# Patient Record
Sex: FEMALE | Race: WHITE | URBAN - METROPOLITAN AREA
[De-identification: names, ages, dates, MRNs, and addresses within clinical notes are randomized per-mention and may not be internally consistent; named-entity substitution may affect disease eponyms.]

---

## 2017-04-25 ENCOUNTER — OFFICE VISIT (OUTPATIENT)
Dept: INTERNAL MEDICINE | Facility: CLINIC | Age: 72
End: 2017-04-25
Payer: MEDICARE

## 2017-04-25 VITALS
BODY MASS INDEX: 28.28 KG/M2 | TEMPERATURE: 98.3 F | HEIGHT: 63 IN | OXYGEN SATURATION: 97 % | SYSTOLIC BLOOD PRESSURE: 130 MMHG | HEART RATE: 81 BPM | WEIGHT: 159.6 LBS | DIASTOLIC BLOOD PRESSURE: 80 MMHG

## 2017-04-25 DIAGNOSIS — Z23 NEED FOR VACCINATION: ICD-10-CM

## 2017-04-25 DIAGNOSIS — M16.11 PRIMARY OSTEOARTHRITIS OF RIGHT HIP: ICD-10-CM

## 2017-04-25 DIAGNOSIS — E78.00 PURE HYPERCHOLESTEROLEMIA: ICD-10-CM

## 2017-04-25 DIAGNOSIS — I10 BENIGN ESSENTIAL HYPERTENSION: Primary | ICD-10-CM

## 2017-04-25 DIAGNOSIS — R73.01 ELEVATED FASTING GLUCOSE: ICD-10-CM

## 2017-04-25 DIAGNOSIS — S73.191S TEAR OF ACETABULAR LABRUM, RIGHT, SEQUELA: ICD-10-CM

## 2017-04-25 PROCEDURE — G0009 ADMIN PNEUMOCOCCAL VACCINE: HCPCS | Performed by: INTERNAL MEDICINE

## 2017-04-25 PROCEDURE — 90732 PPSV23 VACC 2 YRS+ SUBQ/IM: CPT | Performed by: INTERNAL MEDICINE

## 2017-04-25 PROCEDURE — 99202 OFFICE O/P NEW SF 15 MIN: CPT | Mod: 25 | Performed by: INTERNAL MEDICINE

## 2017-04-25 RX ORDER — CLOPIDOGREL BISULFATE 75 MG/1
75 TABLET ORAL DAILY
Refills: 3 | COMMUNITY
Start: 2017-02-06

## 2017-04-25 RX ORDER — LISINOPRIL 20 MG/1
20 TABLET ORAL DAILY
Refills: 3 | COMMUNITY
Start: 2017-02-16

## 2017-04-25 RX ORDER — LISINOPRIL 10 MG/1
10 TABLET ORAL EVERY MORNING
Refills: 3 | COMMUNITY
Start: 2017-02-16

## 2017-04-25 RX ORDER — PAROXETINE HYDROCHLORIDE HEMIHYDRATE 12.5 MG/1
12.5 TABLET, FILM COATED, EXTENDED RELEASE ORAL DAILY
Refills: 3 | COMMUNITY
Start: 2017-04-13

## 2017-04-25 RX ORDER — FENOFIBRATE 160 MG/1
160 TABLET ORAL DAILY
Refills: 3 | COMMUNITY
Start: 2017-02-07

## 2017-04-25 NOTE — NURSING NOTE
"Chief Complaint   Patient presents with     Establish Care     Would like to discuss about about Hypertension, Pre diabetes, dyslipidemia.     Referral     For ortho for R hip pain.       Initial /80 (BP Location: Left arm, Patient Position: Chair, Cuff Size: Adult Regular)  Pulse 81  Temp 98.3  F (36.8  C) (Oral)  Ht 5' 2.7\" (1.593 m)  Wt 159 lb 9.6 oz (72.4 kg)  SpO2 97%  BMI 28.54 kg/m2 Estimated body mass index is 28.54 kg/(m^2) as calculated from the following:    Height as of this encounter: 5' 2.7\" (1.593 m).    Weight as of this encounter: 159 lb 9.6 oz (72.4 kg).  Medication Reconciliation: complete       Screening Questionnaire for Adult Immunization    Are you sick today?   No   Do you have allergies to medications, food, a vaccine component or latex?   No   Have you ever had a serious reaction after receiving a vaccination?   No   Do you have a long-term health problem with heart disease, lung disease, asthma, kidney disease, metabolic disease (e.g. diabetes), anemia, or other blood disorder?   No   Do you have cancer, leukemia, HIV/AIDS, or any other immune system problem?   No   In the past 3 months, have you taken medications that affect  your immune system, such as prednisone, other steroids, or anticancer drugs; drugs for the treatment of rheumatoid arthritis, Crohn s disease, or psoriasis; or have you had radiation treatments?   No   Have you had a seizure, or a brain or other nervous system problem?   No   During the past year, have you received a transfusion of blood or blood     products, or been given immune (gamma) globulin or antiviral drug?   No   For women: Are you pregnant or is there a chance you could become        pregnant during the next month?   No   Have you received any vaccinations in the past 4 weeks?   No     Immunization questionnaire answers were all negative.      MNVFC does apply for the following reason:  Insured: Has insurance that covers the cost of all " vaccines (Not [facility]VFC elligible because insurance already covers all vaccines)    Per orders of Dr. Mcgill, injection of Pneumovax 23 given by Alee Quigley. Patient instructed to remain in clinic for 20 minutes afterwards, and to report any adverse reaction to me immediately.       Screening performed by Alee Quigley on 4/25/2017 at 11:11 AM.

## 2017-04-25 NOTE — PATIENT INSTRUCTIONS
Pneumovax today.    ---    Fasting Labs - may schedule these at  on the way out.      Fasting means no food for 6-8 hours. Water, medications, and black coffee okay.     Results:    If normal: we will release results in [employer] or send them in the mail. You will not be called for these results.    If abnormal, but non-urgent: we will release results in MyChart or send them in the mail. You will not be called for these results.    If abnormal and urgent: we will call you.    ---    Fenofibrate probably not providing significant benefit (no longer recommended for mild elevations of TGs).     ---    I have placed a orthopedic referral for you.    Please schedule an appointment at your earliest convenience - phone number below.     ---    Please sign-up for MyChart  - see instructions below.    Why MyChart?  - you can view your current and past results quickly and easily   - you can email us anytime  - you can schedule appointments quickly and easily  - you can request refills of your medications quickly and easily  - we can contact you with important health information if needed

## 2017-04-25 NOTE — MR AVS SNAPSHOT
After Visit Summary   4/25/2017    Bambi Daly    MRN: [id number]           Patient Information     Date Of Birth          1945        Visit Information        Provider Department      4/25/2017 10:30 AM Naomi Mcgill MD [facility]        Today's Diagnoses     Benign essential hypertension    -  1    Pure hypercholesterolemia        Elevated fasting glucose        Encounter for screening mammogram for breast cancer        Need for vaccination        Tear of acetabular labrum, right, sequela        Primary osteoarthritis of right hip          Care Instructions    Pneumovax today.    ---    Fasting Labs - may schedule these at  on the way out.      Fasting means no food for 6-8 hours. Water, medications, and black coffee okay.     Results:    If normal: we will release results in [employer] or send them in the mail. You will not be called for these results.    If abnormal, but non-urgent: we will release results in MyChart or send them in the mail. You will not be called for these results.    If abnormal and urgent: we will call you.    ---    Fenofibrate probably not providing significant benefit (no longer recommended for mild elevations of TGs).     ---    I have placed a orthopedic referral for you.    Please schedule an appointment at your earliest convenience - phone number below.     ---    Please sign-up for MyChart  - see instructions below.    Why MyChart?  - you can view your current and past results quickly and easily   - you can email us anytime  - you can schedule appointments quickly and easily  - you can request refills of your medications quickly and easily  - we can contact you with important health information if needed              Follow-ups after your visit        Additional Services     ORTHOPEDICS ADULT REFERRAL       Your provider has referred you to: [facility]: [facility] [place]  [phone]   [email or web address]    Please be aware that coverage of these services is subject to the terms and limitations of your health insurance plan.  Call member services at your health plan with any benefit or coverage questions.      Please bring the following to your appointment:    >>   Any x-rays, CTs or MRIs which have been performed.  Contact the facility where they were done to arrange for  prior to your scheduled appointment.    >>   List of current medications   >>   This referral request   >>   Any documents/labs given to you for this referral                  Future tests that were ordered for you today     Open Future Orders        Priority Expected Expires Ordered    Basic metabolic panel Routine  4/25/2018 4/25/2017    Hemoglobin A1c Routine  4/25/2018 4/25/2017    Lipid Profile Routine  4/25/2018 4/25/2017            Who to contact     If you have questions or need follow up information about today's clinic visit or your schedule please contact [facility] directly at [phone].  Normal or non-critical lab and imaging results will be communicated to you by MyChart, letter or phone within 4 business days after the clinic has received the results. If you do not hear from us within 7 days, please contact the clinic through [employer] or phone. If you have a critical or abnormal lab result, we will notify you by phone as soon as possible.  Submit refill requests through [employer] or call your pharmacy and they will forward the refill request to us. Please allow 3 business days for your refill to be completed.          Additional Information About Your Visit        MyChart Information     [employer] lets you send messages to your doctor, view your test results, renew your prescriptions, schedule appointments and more. To sign up, go to [email or web address] . Click on \"Log in\" on the left side of the screen, which will " "take you to the Welcome page. Then click on \"Sign up Now\" on the right side of the page.     You will be asked to enter the access code listed below, as well as some personal information. Please follow the directions to create your username and password.     Your access code is: [email or web address]  Expires: 2017 10:59 AM     Your access code will  in 90 days. If you need help or a new code, please call your [facility] or [phone].        Care EveryWhere ID     This is your Care EveryWhere ID. This could be used by other organizations to access your [facility] medical records  [id number]        Your Vitals Were     Pulse Temperature Height Pulse Oximetry BMI (Body Mass Index)       81 98.3  F (36.8  C) (Oral) 5' 2.7\" (1.593 m) 97% 28.54 kg/m2        Blood Pressure from Last 3 Encounters:   17 130/80    Weight from Last 3 Encounters:   17 159 lb 9.6 oz (72.4 kg)              We Performed the Following     ORTHOPEDICS ADULT REFERRAL     Pneumococcal vaccine 23 valent PPSV23  (Pneumovax) [49320]        Primary Care Provider    None Specified       No primary provider on file.        Thank you!     Thank you for choosing [facility]  for your care. Our goal is always to provide you with excellent care. Hearing back from our patients is one way we can continue to improve our services. Please take a few minutes to complete the written survey that you may receive in the mail after your visit with us. Thank you!             Your Updated Medication List - Protect others around you: Learn how to safely use, store and throw away your medicines at www.disposemymeds.org.          This list is accurate as of: 17 10:59 AM.  Always use your most recent med list.                   Brand Name Dispense Instructions for use    clopidogrel 75 MG tablet    PLAVIX     Take 75 mg by mouth daily       fenofibrate 160 MG tablet      Take 160 mg by mouth daily       * lisinopril 10 MG " tablet    PRINIVIL/ZESTRIL     Take 10 mg by mouth every morning       * lisinopril 20 MG tablet    PRINIVIL/ZESTRIL     Take 20 mg by mouth daily       PARoxetine 12.5 MG 24 hr tablet    PAXIL-CR     Take 12.5 mg by mouth daily       tiZANidine 4 MG tablet    ZANAFLEX     Take 4 mg by mouth daily       * Notice:  This list has 2 medication(s) that are the same as other medications prescribed for you. Read the directions carefully, and ask your doctor or other care provider to review them with you.

## 2017-04-28 ENCOUNTER — TELEPHONE (OUTPATIENT)
Dept: INTERNAL MEDICINE | Facility: CLINIC | Age: 72
End: 2017-04-28

## 2017-04-28 DIAGNOSIS — E78.00 PURE HYPERCHOLESTEROLEMIA: Primary | ICD-10-CM

## 2017-04-28 DIAGNOSIS — I10 BENIGN ESSENTIAL HYPERTENSION: ICD-10-CM

## 2017-04-28 DIAGNOSIS — E78.00 PURE HYPERCHOLESTEROLEMIA: ICD-10-CM

## 2017-04-28 DIAGNOSIS — R73.01 ELEVATED FASTING GLUCOSE: ICD-10-CM

## 2017-04-28 LAB
ANION GAP SERPL CALCULATED.3IONS-SCNC: 8 MMOL/L (ref 3–14)
BUN SERPL-MCNC: 17 MG/DL (ref 7–30)
CALCIUM SERPL-MCNC: 9.4 MG/DL (ref 8.5–10.1)
CHLORIDE SERPL-SCNC: 106 MMOL/L (ref 94–109)
CHOLEST SERPL-MCNC: 266 MG/DL
CO2 SERPL-SCNC: 29 MMOL/L (ref 20–32)
CREAT SERPL-MCNC: 0.73 MG/DL (ref 0.52–1.04)
GFR SERPL CREATININE-BSD FRML MDRD: 78 ML/MIN/1.7M2
GLUCOSE SERPL-MCNC: 100 MG/DL (ref 70–99)
HBA1C MFR BLD: 5.7 % (ref 4.3–6)
HDLC SERPL-MCNC: 49 MG/DL
LDLC SERPL CALC-MCNC: 185 MG/DL
NONHDLC SERPL-MCNC: 217 MG/DL
POTASSIUM SERPL-SCNC: 4.3 MMOL/L (ref 3.4–5.3)
SODIUM SERPL-SCNC: 143 MMOL/L (ref 133–144)
TRIGL SERPL-MCNC: 160 MG/DL

## 2017-04-28 PROCEDURE — 36415 COLL VENOUS BLD VENIPUNCTURE: CPT | Performed by: INTERNAL MEDICINE

## 2017-04-28 PROCEDURE — 83036 HEMOGLOBIN GLYCOSYLATED A1C: CPT | Performed by: INTERNAL MEDICINE

## 2017-04-28 PROCEDURE — 80061 LIPID PANEL: CPT | Performed by: INTERNAL MEDICINE

## 2017-04-28 PROCEDURE — 80048 BASIC METABOLIC PNL TOTAL CA: CPT | Performed by: INTERNAL MEDICINE

## 2017-04-28 RX ORDER — ATORVASTATIN CALCIUM 20 MG/1
20 TABLET, FILM COATED ORAL DAILY
Qty: 90 TABLET | Refills: 3 | Status: SHIPPED | OUTPATIENT
Start: 2017-04-28 | End: 2018-04-10

## 2017-04-28 NOTE — TELEPHONE ENCOUNTER
Cholesterol significantly elevated OFF of Vytorin (which she stopped because of muscle aches).     Recommend restarting something.     Will start with atorvastatin 20mg daily.     Discussed with patient.

## 2017-08-17 ENCOUNTER — TRANSFERRED RECORDS (OUTPATIENT)
Dept: HEALTH INFORMATION MANAGEMENT | Facility: CLINIC | Age: 72
End: 2017-08-17

## 2017-09-05 ENCOUNTER — OFFICE VISIT (OUTPATIENT)
Dept: INTERNAL MEDICINE | Facility: CLINIC | Age: 72
End: 2017-09-05
Payer: MEDICARE

## 2017-09-05 VITALS
DIASTOLIC BLOOD PRESSURE: 84 MMHG | HEART RATE: 75 BPM | SYSTOLIC BLOOD PRESSURE: 130 MMHG | OXYGEN SATURATION: 99 % | WEIGHT: 161.1 LBS | HEIGHT: 63 IN | BODY MASS INDEX: 28.54 KG/M2 | TEMPERATURE: 98.4 F

## 2017-09-05 DIAGNOSIS — Z01.818 PREOPERATIVE EXAMINATION: Primary | ICD-10-CM

## 2017-09-05 DIAGNOSIS — Z23 NEED FOR PROPHYLACTIC VACCINATION AND INOCULATION AGAINST INFLUENZA: ICD-10-CM

## 2017-09-05 DIAGNOSIS — F41.9 ANXIETY: ICD-10-CM

## 2017-09-05 DIAGNOSIS — E78.00 PURE HYPERCHOLESTEROLEMIA: ICD-10-CM

## 2017-09-05 DIAGNOSIS — Z86.73 HISTORY OF STROKE: ICD-10-CM

## 2017-09-05 DIAGNOSIS — M16.11 PRIMARY OSTEOARTHRITIS OF RIGHT HIP: ICD-10-CM

## 2017-09-05 DIAGNOSIS — I10 BENIGN ESSENTIAL HYPERTENSION: ICD-10-CM

## 2017-09-05 LAB
ALBUMIN UR-MCNC: NEGATIVE MG/DL
APPEARANCE UR: CLEAR
BILIRUB UR QL STRIP: NEGATIVE
COLOR UR AUTO: YELLOW
GLUCOSE UR STRIP-MCNC: NEGATIVE MG/DL
HGB UR QL STRIP: NEGATIVE
KETONES UR STRIP-MCNC: NEGATIVE MG/DL
LEUKOCYTE ESTERASE UR QL STRIP: NEGATIVE
MRSA DNA SPEC QL NAA+PROBE: NEGATIVE
NITRATE UR QL: NEGATIVE
PH UR STRIP: 7 PH (ref 5–7)
SOURCE: NORMAL
SP GR UR STRIP: 1.01 (ref 1–1.03)
SPECIMEN SOURCE: NORMAL
UROBILINOGEN UR STRIP-ACNC: 0.2 EU/DL (ref 0.2–1)

## 2017-09-05 PROCEDURE — 90662 IIV NO PRSV INCREASED AG IM: CPT | Performed by: INTERNAL MEDICINE

## 2017-09-05 PROCEDURE — G0008 ADMIN INFLUENZA VIRUS VAC: HCPCS | Performed by: INTERNAL MEDICINE

## 2017-09-05 PROCEDURE — 99215 OFFICE O/P EST HI 40 MIN: CPT | Mod: 25 | Performed by: INTERNAL MEDICINE

## 2017-09-05 PROCEDURE — 87641 MR-STAPH DNA AMP PROBE: CPT | Performed by: INTERNAL MEDICINE

## 2017-09-05 PROCEDURE — 87640 STAPH A DNA AMP PROBE: CPT | Mod: XU | Performed by: INTERNAL MEDICINE

## 2017-09-05 PROCEDURE — 93000 ELECTROCARDIOGRAM COMPLETE: CPT | Performed by: INTERNAL MEDICINE

## 2017-09-05 PROCEDURE — 81003 URINALYSIS AUTO W/O SCOPE: CPT | Performed by: INTERNAL MEDICINE

## 2017-09-05 RX ORDER — CELECOXIB 100 MG/1
100 CAPSULE ORAL PRN
COMMUNITY
Start: 2017-09-05

## 2017-09-05 NOTE — PROGRESS NOTES
SUBJECTIVE:                                                      HPI: Bambi Daly is a pleasant 72 year old female who presents for preoperative evaluation.    Surgeon: Vicente  Date: 9/28/17  Location: [facility], Fax: [phone]  Surgery: R BEAN (intermediate risk)  Indication: severe R hip OA    Past Medical History:   Diagnosis Date     Anxiety      Benign essential hypertension      History of TIA (transient ischemic attack)       History of stroke 2015    no residual deficits; on Plavix     Impaired fasting glucose      Pure hypercholesterolemia      Personal or family history of excessive or prolonged bleeding? No  Personal or family history of blood clots? No  History of snoring/Sleep Apnea? No  History of blood transfusions? No    Clinical Predictors of Increased Risk: minor: advanced age    Past Surgical History:   Procedure Laterality Date     TONSILLECTOMY & ADENOIDECTOMY  1965     Artificial assistive devices, such as pacemakers, artificial cardiac valves, or artificial joints? No    No Known Allergies     Personal or family history of allergy to anesthesia? No  Allergy to local or topical analgesics? No  Allergy to latex? No  Allergy to adhesives/skin preparations (chlorhexadine)? No    Current Outpatient Prescriptions   Medication Sig     celecoxib (CELEBREX) 100 MG capsule Take 1 capsule (100 mg) by mouth as needed for moderate pain     atorvastatin (LIPITOR) 20 MG tablet Take 1 tablet (20 mg) by mouth daily     PARoxetine (PAXIL-CR) 12.5 MG 24 hr tablet Take 12.5 mg by mouth daily     tiZANidine (ZANAFLEX) 4 MG tablet Take 4 mg by mouth daily     lisinopril (PRINIVIL/ZESTRIL) 10 MG tablet Take 10 mg by mouth every morning     lisinopril (PRINIVIL/ZESTRIL) 20 MG tablet Take 20 mg by mouth daily     fenofibrate 160 MG tablet Take 160 mg by mouth daily     clopidogrel (PLAVIX) 75 MG tablet Take 75 mg by mouth daily     Over the counter medications? Tylenol as needed   Vitamin Supplements? Centrum  "Silver, Calcium  Herbal Supplements? No    Family History   Problem Relation Age of Onset     Hypertension Mother      Coronary Artery Disease Mother      CABG later in life     Heart Surgery Mother      AVR     Neurologic Disorder Father      Creutzfeldt Eliu Disease     DIABETES No family hx of      Myocardial Infarction No family hx of      CEREBROVASCULAR DISEASE No family hx of      Coronary Artery Disease Early Onset No family hx of      Breast Cancer No family hx of      Ovarian Cancer No family hx of      Colon Cancer No family hx of      Occupational History     Retired RN       Social History Main Topics     Smoking status: Former Smoker     Packs/day: 0.50     Years: 35.00     Types: Cigarettes     Quit date: 1/1/1999     Smokeless tobacco: Never Used     Alcohol use Yes      Comment: 1 drink/week     Drug use: No     Sexual activity: No     Functional capacity: Can walk up a flight of steps or a hill (4 METs)    ROS:  Constitutional: denies unintentional weight loss or gain; denies fevers, chills, or sweats     Cardiovascular: denies chest pain, palpitations, or edema  Respiratory: denies cough, wheezing, shortness of breath, or dyspnea on exertion  Gastrointestinal: denies nausea, vomiting, constipation, diarrhea, or abdominal pain  Genitourinary: denies urinary frequency, urgency, dysuria, or hematuria  Integumentary: denies rash or pruritus  Musculoskeletal: bilateral groin pain; chronic, mild lower back pain; no muscle pain, joint pain, or joint swelling  Neurologic: denies focal weakness, numbness, or tingling  Hematologic/Immunologic: denies history of anemia or blood transfusions  Endocrine: denies heat or cold intolerance; denies polyuria, polydipsia  Psychiatric: denies depression or anxiety    OBJECTIVE:                                                      /84 (BP Location: Left arm, Patient Position: Chair, Cuff Size: Adult Large)  Pulse 75  Temp 98.4  F (36.9  C) (Oral)  Ht 5' 2.7\" " (1.593 m)  Wt 161 lb 1.6 oz (73.1 kg)  SpO2 99%  BMI 28.81 kg/m2  Constitutional: well-appearing  Eyes: normal conjunctivae and lids; pupils equal, round, and reactive to light  Ears, Nose, Mouth, and Throat: normal ears and nose; tympanic membranes visualized and normal; normal lips, teeth, and gums; no oropharyngeal lesions or ulcers  Neck: supple and symmetric; no lymphadenopathy; no thyromegaly or masses  Respiratory: normal respiratory effort; clear to auscultation bilaterally  Cardiovascular: regular rate and rhythm; pedal pulses palpable; no edema  Gastrointestinal: soft, non-tender, non-distended, and bowel sounds present; no organomegaly or masses  Musculoskeletal: normal gait and station  Psych: normal judgment and insight; normal mood and affect; recent and remote memory intact; oriented to time, place, and person    EKG (5/10/15): sinus rhythm, old inferior and anterior infarcts    Regadenoson stress test (5/20/15): normal    ASSESSMENT/PLAN:                                                      Bambi Daly is a pleasant 72 year old female who presents for a preoperative evaluation. She is at low clinical risk for an intermediate risk procedure.    (Z01.818) Preoperative examination  (primary encounter diagnosis)  (M16.11) Primary osteoarthritis of right hip  (I10) Benign essential hypertension  (F41.9) Anxiety  (E78.00) Pure hypercholesterolemia  (Z86.73) History of stroke  Plan:    - UA reflex, MRSA nasal swab, and EKG today.   - CBC and CMP today.   - HOLD Celebrex (and all NSAIDs) 1 week prior to surgery.   - HOLD Plavix 5 days prior to surgery.   - HOLD lisinopril on the morning of surgery.   - may take all other medications as before.      (E78.00) Pure hypercholesterolemia  Comment: switched from Vytorin to atorvastatin 20 mg daily earlier this year.  Plan: patient will return for fasting labs when able.     (Z23) Need for prophylactic vaccination and inoculation against influenza  Plan:  flu shot given today.    RECOMMEND PROCEEDING WITH SURGERY: YES.    Thank you for referring Bambi Daly to me for consultation and allowing me to participate in her care.    The instructions on the AVS were discussed and explained to the patient. Patient expressed understanding of instructions.    (Chart documentation was completed, in part, with [employer] voice-recognition software. Even though reviewed, some grammatical, spelling, and word errors may remain.)    Naomi Mcgill MD   [address] [address]  T: [phone], F: [phone]

## 2017-09-05 NOTE — PATIENT INSTRUCTIONS
Urine sample, EKG, and nasal swab today.    May come back for fasting labs tomorrow.    ---    Re: medications:    STOP Celebrex 1 week before surgery. No ibuprofen (Advil or Motrin).    STOP Plavix 5 days before surgery.    DO NOT take lisinopril on the morning of surgery (may take it the night before).    TAKE Paxil XR and fenofibrate on the morning of surgery (with a sip of water).     ---

## 2017-09-05 NOTE — MR AVS SNAPSHOT
After Visit Summary   9/5/2017    Bambi Daly    MRN: [id number]           Patient Information     Date Of Birth          1945        Visit Information        Provider Department      9/5/2017 11:00 AM Naomi Mcgill MD [facility]        Today's Diagnoses     Preoperative examination    -  1    Primary osteoarthritis of right hip        Benign essential hypertension        Anxiety        Pure hypercholesterolemia        History of stroke          Care Instructions    Urine sample, EKG, and nasal swab today.    May come back for fasting labs tomorrow.    ---    Re: medications:    STOP Celebrex 1 week before surgery. No ibuprofen (Advil or Motrin).    STOP Plavix 5 days before surgery.    DO NOT take lisinopril on the morning of surgery (may take it the night before).    TAKE Paxil XR and fenofibrate on the morning of surgery (with a sip of water).     ---              Follow-ups after your visit        Future tests that were ordered for you today     Open Future Orders        Priority Expected Expires Ordered    CBC with platelets Routine  9/5/2018 9/5/2017    Comprehensive metabolic panel Routine  9/5/2018 9/5/2017    Lipid Profile Routine  9/5/2018 9/5/2017            Who to contact     If you have questions or need follow up information about today's clinic visit or your schedule please contact [facility] directly at [phone].  Normal or non-critical lab and imaging results will be communicated to you by MyChart, letter or phone within 4 business days after the clinic has received the results. If you do not hear from us within 7 days, please contact the clinic through MyChart or phone. If you have a critical or abnormal lab result, we will notify you by phone as soon as possible.  Submit refill requests through [employer] or call your pharmacy and they will forward the refill request to us. Please allow 3 business days for your refill  "to be completed.          Additional Information About Your Visit        [employer] Information     [employer] gives you secure access to your electronic health record. If you see a primary care provider, you can also send messages to your care team and make appointments. If you have questions, please call your primary care clinic.  If you do not have a primary care provider, please call [phone] and they will assist you.        Care EveryWhere ID     This is your Care EveryWhere ID. This could be used by other organizations to access your [facility] medical records  [id number]        Your Vitals Were     Pulse Temperature Height Pulse Oximetry BMI (Body Mass Index)       75 98.4  F (36.9  C) (Oral) 5' 2.7\" (1.593 m) 99% 28.81 kg/m2        Blood Pressure from Last 3 Encounters:   09/05/17 158/90   04/25/17 130/80    Weight from Last 3 Encounters:   09/05/17 161 lb 1.6 oz (73.1 kg)   04/25/17 159 lb 9.6 oz (72.4 kg)              We Performed the Following     EKG 12-lead complete w/read - Clinics     Methicillin Resistant Staph Aureus PCR     UA reflex to Microscopic and Culture        Primary Care Provider    None Specified       No primary provider on file.        Equal Access to Services     YURIY LORD : Hadii lily Rivers, wamarshalda gaye, qaybta kaalmada adeannetteda, ludivina flores. So [facility] [phone].    ATENCIÓN: Si habla español, tiene a frost disposición servicios gratuitos de asistencia lingüística. Llame al [phone].    We comply with applicable federal civil rights laws and [place] laws. We do not discriminate on the basis of race, color, national origin, age, disability sex, sexual orientation or gender identity.            Thank you!     Thank you for choosing [facility]  for your care. Our goal is always to provide you with excellent care. Hearing back from our patients is one way we can continue to improve our services. Please take a " few minutes to complete the written survey that you may receive in the mail after your visit with us. Thank you!             Your Updated Medication List - Protect others around you: Learn how to safely use, store and throw away your medicines at www.disposemymeds.org.          This list is accurate as of: 9/5/17 11:30 AM.  Always use your most recent med list.                   Brand Name Dispense Instructions for use Diagnosis    atorvastatin 20 MG tablet    LIPITOR    90 tablet    Take 1 tablet (20 mg) by mouth daily    Pure hypercholesterolemia       celeBREX 100 MG capsule   Generic drug:  celecoxib      Take 1 capsule (100 mg) by mouth as needed for moderate pain        clopidogrel 75 MG tablet    PLAVIX     Take 75 mg by mouth daily        fenofibrate 160 MG tablet      Take 160 mg by mouth daily        * lisinopril 10 MG tablet    PRINIVIL/ZESTRIL     Take 10 mg by mouth every morning        * lisinopril 20 MG tablet    PRINIVIL/ZESTRIL     Take 20 mg by mouth daily        PARoxetine 12.5 MG 24 hr tablet    PAXIL-CR     Take 12.5 mg by mouth daily        tiZANidine 4 MG tablet    ZANAFLEX     Take 4 mg by mouth daily        * Notice:  This list has 2 medication(s) that are the same as other medications prescribed for you. Read the directions carefully, and ask your doctor or other care provider to review them with you.

## 2017-09-05 NOTE — NURSING NOTE
"Chief Complaint   Patient presents with     Pre-Op Exam     9/28/17 [facility]. R side Hip replacement surgery. Fax - [phone]       Initial /90 (BP Location: Left arm, Patient Position: Chair, Cuff Size: Adult Regular)  Pulse 75  Temp 98.4  F (36.9  C) (Oral)  Ht 5' 2.7\" (1.593 m)  Wt 161 lb 1.6 oz (73.1 kg)  SpO2 99%  BMI 28.81 kg/m2 Estimated body mass index is 28.81 kg/(m^2) as calculated from the following:    Height as of this encounter: 5' 2.7\" (1.593 m).    Weight as of this encounter: 161 lb 1.6 oz (73.1 kg).  Medication Reconciliation: complete     Kaminibose MA      "

## 2017-09-06 DIAGNOSIS — Z01.818 PREOPERATIVE EXAMINATION: ICD-10-CM

## 2017-09-06 DIAGNOSIS — E78.00 PURE HYPERCHOLESTEROLEMIA: ICD-10-CM

## 2017-09-06 LAB
ALBUMIN SERPL-MCNC: 3.7 G/DL (ref 3.4–5)
ALP SERPL-CCNC: 98 U/L (ref 40–150)
ALT SERPL W P-5'-P-CCNC: 21 U/L (ref 0–50)
ANION GAP SERPL CALCULATED.3IONS-SCNC: 7 MMOL/L (ref 3–14)
AST SERPL W P-5'-P-CCNC: 12 U/L (ref 0–45)
BILIRUB SERPL-MCNC: 0.3 MG/DL (ref 0.2–1.3)
BUN SERPL-MCNC: 13 MG/DL (ref 7–30)
CALCIUM SERPL-MCNC: 9.7 MG/DL (ref 8.5–10.1)
CHLORIDE SERPL-SCNC: 107 MMOL/L (ref 94–109)
CHOLEST SERPL-MCNC: 190 MG/DL
CO2 SERPL-SCNC: 28 MMOL/L (ref 20–32)
CREAT SERPL-MCNC: 0.63 MG/DL (ref 0.52–1.04)
ERYTHROCYTE [DISTWIDTH] IN BLOOD BY AUTOMATED COUNT: 14.4 % (ref 10–15)
GFR SERPL CREATININE-BSD FRML MDRD: >90 ML/MIN/1.7M2
GLUCOSE SERPL-MCNC: 111 MG/DL (ref 70–99)
HCT VFR BLD AUTO: 45.2 % (ref 35–47)
HDLC SERPL-MCNC: 52 MG/DL
HGB BLD-MCNC: 14.1 G/DL (ref 11.7–15.7)
LDLC SERPL CALC-MCNC: 110 MG/DL
MCH RBC QN AUTO: 29 PG (ref 26.5–33)
MCHC RBC AUTO-ENTMCNC: 31.2 G/DL (ref 31.5–36.5)
MCV RBC AUTO: 93 FL (ref 78–100)
NONHDLC SERPL-MCNC: 138 MG/DL
PLATELET # BLD AUTO: 338 10E9/L (ref 150–450)
POTASSIUM SERPL-SCNC: 4.1 MMOL/L (ref 3.4–5.3)
PROT SERPL-MCNC: 7.7 G/DL (ref 6.8–8.8)
RBC # BLD AUTO: 4.87 10E12/L (ref 3.8–5.2)
SODIUM SERPL-SCNC: 142 MMOL/L (ref 133–144)
TRIGL SERPL-MCNC: 140 MG/DL
WBC # BLD AUTO: 9 10E9/L (ref 4–11)

## 2017-09-06 PROCEDURE — 80053 COMPREHEN METABOLIC PANEL: CPT | Performed by: INTERNAL MEDICINE

## 2017-09-06 PROCEDURE — 80061 LIPID PANEL: CPT | Performed by: INTERNAL MEDICINE

## 2017-09-06 PROCEDURE — 36415 COLL VENOUS BLD VENIPUNCTURE: CPT | Performed by: INTERNAL MEDICINE

## 2017-09-06 PROCEDURE — 85027 COMPLETE CBC AUTOMATED: CPT | Performed by: INTERNAL MEDICINE

## 2017-09-14 ENCOUNTER — TRANSFERRED RECORDS (OUTPATIENT)
Dept: HEALTH INFORMATION MANAGEMENT | Facility: CLINIC | Age: 72
End: 2017-09-14

## 2017-09-15 ENCOUNTER — MYC MEDICAL ADVICE (OUTPATIENT)
Dept: INTERNAL MEDICINE | Facility: CLINIC | Age: 72
End: 2017-09-15

## 2017-09-28 ENCOUNTER — TRANSFERRED RECORDS (OUTPATIENT)
Dept: HEALTH INFORMATION MANAGEMENT | Facility: CLINIC | Age: 72
End: 2017-09-28

## 2018-02-01 ENCOUNTER — THERAPY VISIT (OUTPATIENT)
Dept: PHYSICAL THERAPY | Facility: CLINIC | Age: 73
End: 2018-02-01
Payer: MEDICARE

## 2018-02-01 DIAGNOSIS — M25.512 BILATERAL SHOULDER PAIN: Primary | ICD-10-CM

## 2018-02-01 DIAGNOSIS — M25.511 BILATERAL SHOULDER PAIN: Primary | ICD-10-CM

## 2018-02-01 PROCEDURE — 97110 THERAPEUTIC EXERCISES: CPT | Mod: GP | Performed by: PHYSICAL THERAPIST

## 2018-02-01 PROCEDURE — G8984 CARRY CURRENT STATUS: HCPCS | Mod: GP | Performed by: PHYSICAL THERAPIST

## 2018-02-01 PROCEDURE — G8985 CARRY GOAL STATUS: HCPCS | Mod: GP | Performed by: PHYSICAL THERAPIST

## 2018-02-01 PROCEDURE — 97161 PT EVAL LOW COMPLEX 20 MIN: CPT | Mod: GP | Performed by: PHYSICAL THERAPIST

## 2018-02-01 NOTE — PROGRESS NOTES
[facility] Initial Evaluation  Subjective:  Patient is a 72 year old female presenting with rehab left shoulder hpi.   Bambi Daly is a 72 year old female with a bilateral shoulders condition.  Condition occurred with:  Degenerative joint disease.    This is a chronic condition  Pt presents with complaints of bilateral shoulder pain. Pt reported 5 year history of shoulder pain, gradually increasing over the years. Pt reported a recent cortisone injection to the right shoulder in January; slightly improved sx's as a result. Imaging (x-rays) revealed significant DJD. PT orders generated 1-5-18. Pt reported shoulder pain interferes with all ADL's.    Patient reports pain:  Lateral.    Pain is described as aching and is intermittent Pain Scale: 2-9/10.  Associated symptoms:  Loss of motion/stiffness and loss of strength. Pain is worse during the day and worse during the night.  Symptoms are exacerbated by using arm behind back, using arm overhead, using arm at shoulder level, lifting, carrying and lying on extremity and relieved by rest.  Since onset symptoms are gradually worsening.  Special tests:  X-ray.  Previous treatment includes other (cortisone injection).    General health as reported by patient is good.                  Barriers include:  None as reported by the patient.    Red flags:  None as reported by the patient.                        Objective:  System                   Shoulder Evaluation:  ROM:  AROM:    Flexion:  Left:  140    Right:  132    Abduction:  Left: 142   Right:  132                  Extension/Internal Rotation:  Left:  L3    Right:  L4    PROM:    Flexion:  Left:  160 w/pain    Right: WNL      Abduction:  Left:  WNL    Right:  WNL    Internal Rotation:  Left:  60    Right:  60  External Rotation:  Left:  25    Right:  45                    Strength:  : significant crepitus noted with resisted movements on the R.      Abduction:  Left: 4/5  Pain:    Right: 4/5      Pain:    Internal Rotation:  Left:4/5     Pain:    Right: 4/5     Pain:  External Rotation:   Left:4/5     Pain:   Right:4/5     Pain:                                                     General     ROS    Assessment/Plan:    Patient is a 72 year old female with both sides shoulder complaints.    Patient has the following significant findings with corresponding treatment plan.                Diagnosis 1:  Bilateral shoulder pain  Pain -  US, self management, education and home program  Decreased ROM/flexibility - manual therapy and therapeutic exercise  Decreased strength - therapeutic exercise and therapeutic activities  Decreased function - therapeutic activities    Therapy Evaluation Codes:   1) History comprised of:   Personal factors that impact the plan of care:      Time since onset of symptoms.    Comorbidity factors that impact the plan of care are:      Osteoarthritis.     Medications impacting care: None.  2) Examination of Body Systems comprised of:   Body structures and functions that impact the plan of care:      Shoulder.   Activity limitations that impact the plan of care are:      Bathing, Cooking, Dressing, Lifting and Sleeping.  3) Clinical presentation characteristics are:   Stable/Uncomplicated.  4) Decision-Making    Low complexity using standardized patient assessment instrument and/or measureable assessment of functional outcome.  Cumulative Therapy Evaluation is: Low complexity.    Previous and current functional limitations:  (See Goal Flow Sheet for this information)    Short term and Long term goals: (See Goal Flow Sheet for this information)     Communication ability:  Patient appears to be able to clearly communicate and understand verbal and written communication and follow directions correctly.  Treatment Explanation - The following has been discussed with the patient:   RX ordered/plan of care  Anticipated outcomes  Possible risks and side effects  This patient would benefit from PT  intervention to resume normal activities.   Rehab potential is fair.    Frequency:  1 X week, once daily  Duration:  for 6 weeks  Discharge Plan:  Achieve all LTG.  Independent in home treatment program.  Reach maximal therapeutic benefit.    Please refer to the daily flowsheet for treatment today, total treatment time and time spent performing 1:1 timed codes.

## 2018-02-01 NOTE — MR AVS SNAPSHOT
After Visit Summary   2/1/2018    Bambi Daly    MRN: [id number]           Patient Information     Date Of Birth          1945        Visit Information        Provider Department      2/1/2018 11:30 AM Tamica Shetty PT [facility] Physical Therapy        Today's Diagnoses     Bilateral shoulder pain    -  1       Follow-ups after your visit        Your next 10 appointments already scheduled     Feb 09, 2018 11:30 AM CST   QUIRINO Extremity with Tamica Shetty PT   [facility] Physical Therapy ([place]  )    [address] [id number] [phone]              Who to contact     If you have questions or need follow up information about today's clinic visit or your schedule please contact [facility] PHYSICAL THERAPY directly at [phone].  Normal or non-critical lab and imaging results will be communicated to you by MyChart, letter or phone within 4 business days after the clinic has received the results. If you do not hear from us within 7 days, please contact the clinic through [employer] or phone. If you have a critical or abnormal lab result, we will notify you by phone as soon as possible.  Submit refill requests through [employer] or call your pharmacy and they will forward the refill request to us. Please allow 3 business days for your refill to be completed.          Additional Information About Your Visit        MyChart Information     [employer] gives you secure access to your electronic health record. If you see a primary care provider, you can also send messages to your care team and make appointments. If you have questions, please call your primary care clinic.  If you do not have a primary care provider, please call [phone] and they will assist you.        Care EveryWhere ID     This is your Care EveryWhere ID. This could be used by other  organizations to access your [place] medical records  [id number]         Blood Pressure from Last 3 Encounters:   09/05/17 130/84   04/25/17 130/80    Weight from Last 3 Encounters:   09/05/17 73.1 kg (161 lb 1.6 oz)   04/25/17 72.4 kg (159 lb 9.6 oz)              We Performed the Following     QUIRINO CERT REPORT     QUIRINO Inital Eval Report     PT Eval, Low Complexity ([id number])     Therapeutic Exercises        Primary Care Provider Office Phone # Fax #    Naomi Mcgill [phone]       [address] [facility] [address]        Equal Access to Services     DAVID LORD : Hadii aad ku hadasho Soomaali, waaxda luqadaha, qaybta kaalmada adeegyada, waxay idiin hayaan adeeg felicia aranda . So [facility] [phone].    ATENCIÓN: Si habla español, tiene a frost disposición servicios gratuitos de asistencia lingüística. Llame al [phone].    We comply with applicable federal civil rights laws and [place] laws. We do not discriminate on the basis of race, color, national origin, age, disability, sex, sexual orientation, or gender identity.            Thank you!     Thank you for choosing [facility] PHYSICAL THERAPY  for your care. Our goal is always to provide you with excellent care. Hearing back from our patients is one way we can continue to improve our services. Please take a few minutes to complete the written survey that you may receive in the mail after your visit with us. Thank you!             Your Updated Medication List - Protect others around you: Learn how to safely use, store and throw away your medicines at www.disposemymeds.org.          This list is accurate as of 2/1/18  4:04 PM.  Always use your most recent med list.                   Brand Name Dispense Instructions for use Diagnosis    atorvastatin 20 MG tablet    LIPITOR    90 tablet    Take 1 tablet (20 mg) by mouth daily    Pure hypercholesterolemia       celeBREX 100 MG capsule   Generic drug:   celecoxib      Take 1 capsule (100 mg) by mouth as needed for moderate pain        clopidogrel 75 MG tablet    PLAVIX     Take 75 mg by mouth daily        fenofibrate 160 MG tablet      Take 160 mg by mouth daily        * lisinopril 10 MG tablet    PRINIVIL/ZESTRIL     Take 10 mg by mouth every morning        * lisinopril 20 MG tablet    PRINIVIL/ZESTRIL     Take 20 mg by mouth daily        PARoxetine 12.5 MG 24 hr tablet    PAXIL-CR     Take 12.5 mg by mouth daily        tiZANidine 4 MG tablet    ZANAFLEX     Take 4 mg by mouth daily        * Notice:  This list has 2 medication(s) that are the same as other medications prescribed for you. Read the directions carefully, and ask your doctor or other care provider to review them with you.

## 2018-02-01 NOTE — LETTER
DEPARTMENT OF HEALTH AND HUMAN SERVICES  CENTERS FOR MEDICARE & MEDICAID SERVICES    PLAN/UPDATED PLAN OF PROGRESS FOR OUTPATIENT REHABILITATION    PATIENTS NAME:  Bambi Daly   : 1945  PROVIDER NUMBER:    [id number]  [id number]   PROVIDER NAME: [facility][note 3] PHYSICAL [facility]  MEDICAL RECORD NUMBER: [id number]   START OF CARE DATE:  SOC Date: 18   TYPE:  PT  PRIMARY/TREATMENT DIAGNOSIS: (Pertinent Medical Diagnosis)  Bilateral shoulder pain    VISITS FROM START OF CARE:  Rxs Used: 1     [place] [facility] Initial Evaluation  Subjective:  Bambi Daly is a 72 year old female with a bilateral shoulders condition.  Condition occurred with:  Degenerative joint disease.    This is a chronic condition  Pt presents with complaints of bilateral shoulder pain. Pt reported 5 year history of shoulder pain, gradually increasing over the years. Pt reported a recent cortisone injection to the right shoulder in January; slightly improved sx's as a result. Imaging (x-rays) revealed significant DJD. PT orders generated 18. Pt reported shoulder pain interferes with all ADL's.  Patient reports pain:  Lateral.  Pain is described as aching and is intermittent Pain Scale: 2-9/10.  Associated symptoms:  Loss of motion/stiffness and loss of strength. Pain is worse during the day and worse during the night.  Symptoms are exacerbated by using arm behind back, using arm overhead, using arm at shoulder level, lifting, carrying and lying on extremity and relieved by rest.  Since onset symptoms are gradually worsening.  Special tests:  X-ray.  Previous treatment includes other (cortisone injection).  General health as reported by patient is good.             Barriers include:  None as reported by the patient.  Red flags:  None as reported by the patient.  Pertinent medical history includes:  Osteoarthritis, overweight, high blood pressure, stroke, implanted device,  smoking and other (numbness/tingling, pain at night/rest).    Other surgeries include:  Orthopedic surgery (Rt hip).  Current medications:  High blood pressure medication, anti-inflammatory and anti-depressants.  Current occupation is Retired.        Objective:  System  Shoulder Evaluation:  ROM:  AROM:    Flexion:  Left:  140    Right:  132  Abduction:  Left: 142   Right:  132  Extension/Internal Rotation:  Left:  L3    Right:  L4    PROM:    Flexion:  Left:  160 w/pain    Right: WNL    Abduction:  Left:  WNL    Right:  WNL  Internal Rotation:  Left:  60    Right:  60  External Rotation:  Left:  25    Right:  45    Strength:  : significant crepitus noted with resisted movements on the R.  Abduction:  Left: 4/5  Pain:    Right: 4/5     Pain:  Internal Rotation:  Left:4/5     Pain:    Right: 4/5     Pain:  External Rotation:   Left:4/5     Pain:   Right:4/5     Pain:      ROS  Assessment/Plan:    Patient is a 72 year old female with both sides shoulder complaints.    Patient has the following significant findings with corresponding treatment plan.                Diagnosis 1:  Bilateral shoulder pain  Pain -  US, self management, education and home program  Decreased ROM/flexibility - manual therapy and therapeutic exercise  Decreased strength - therapeutic exercise and therapeutic activities  Decreased function - therapeutic activities    Therapy Evaluation Codes:   1) History comprised of:   Personal factors that impact the plan of care:      Time since onset of symptoms.    Comorbidity factors that impact the plan of care are:      Osteoarthritis.     Medications impacting care: None.  2) Examination of Body Systems comprised of:   Body structures and functions that impact the plan of care:      Shoulder.   Activity limitations that impact the plan of care are:      Bathing, Cooking, Dressing, Lifting and Sleeping.  3) Clinical presentation characteristics are:   Stable/Uncomplicated.  4) Decision-Making    Low  "complexity using standardized patient assessment instrument and/or   measureable assessment of functional outcome.  Cumulative Therapy Evaluation is: Low complexity.    Previous and current functional limitations:  (See Goal Flow Sheet for this information)    Short term and Long term goals: (See Goal Flow Sheet for this information)     Communication ability:  Patient appears to be able to clearly communicate and understand verbal and written communication and follow directions correctly.  Treatment Explanation - The following has been discussed with the patient:   RX ordered/plan of care  Anticipated outcomes  Possible risks and side effects  This patient would benefit from PT intervention to resume normal activities.   Rehab potential is fair.    Frequency:  1 X week, once daily  Duration:  for 6 weeks  Discharge Plan:  Achieve all LTG.  Independent in home treatment program.  Reach maximal therapeutic benefit.                                      Caregiver Signature/Credentials _____________________________ Date ________       Treating Provider: Tamica Shetty, PT   I have reviewed and certified the need for these services and plan of treatment while under my care.        PHYSICIAN'S SIGNATURE:   ___________________________________  Date___________                               Timothy Frias MD    Certification period:  Beginning of Cert date period: 02/01/18 to  End of Cert period date: 05/01/18     Functional Level Progress Report: Please see attached \"Goal Flow sheet for Functional level.\"    ____X____ Continue Services or       ________ DC Services                Service dates: From  SOC Date: 02/01/18 date to present                         "

## 2018-02-02 NOTE — PROGRESS NOTES
[facility] Initial Evaluation  Subjective:  Patient is a 72 year old female presenting with rehab left ankle/foot hpi.                                      Pertinent medical history includes:  Osteoarthritis, overweight, high blood pressure, stroke, implanted device, smoking and other (numbness/tingling, pain at night/rest).    Other surgeries include:  Orthopedic surgery (Rt hip).  Current medications:  High blood pressure medication, anti-inflammatory and anti-depressants.  Current occupation is Retired.                                    Objective:  System    Physical Exam    General     ROS    Assessment/Plan:

## 2018-02-09 ENCOUNTER — THERAPY VISIT (OUTPATIENT)
Dept: PHYSICAL THERAPY | Facility: CLINIC | Age: 73
End: 2018-02-09
Payer: MEDICARE

## 2018-02-09 DIAGNOSIS — M25.511 BILATERAL SHOULDER PAIN: ICD-10-CM

## 2018-02-09 DIAGNOSIS — M25.512 BILATERAL SHOULDER PAIN: ICD-10-CM

## 2018-02-09 PROCEDURE — 97110 THERAPEUTIC EXERCISES: CPT | Mod: GP | Performed by: PHYSICAL THERAPIST

## 2018-02-09 PROCEDURE — 97112 NEUROMUSCULAR REEDUCATION: CPT | Mod: GP | Performed by: PHYSICAL THERAPIST

## 2018-02-09 NOTE — MR AVS SNAPSHOT
After Visit Summary   2/9/2018    Bambi Daly    MRN: [id number]           Patient Information     Date Of Birth          1945        Visit Information        Provider Department      2/9/2018 11:30 AM Tamica Shetty PT [facility] Physical Therapy        Today's Diagnoses     Bilateral shoulder pain           Follow-ups after your visit        Who to contact     If you have questions or need follow up information about today's clinic visit or your schedule please contact [facility] ATHLETIC [facility] PHYSICAL THERAPY directly at [phone].  Normal or non-critical lab and imaging results will be communicated to you by [employer], letter or phone within 4 business days after the clinic has received the results. If you do not hear from us within 7 days, please contact the clinic through [employer] or phone. If you have a critical or abnormal lab result, we will notify you by phone as soon as possible.  Submit refill requests through [employer] or call your pharmacy and they will forward the refill request to us. Please allow 3 business days for your refill to be completed.          Additional Information About Your Visit        MyChart Information     [employer] gives you secure access to your electronic health record. If you see a primary care provider, you can also send messages to your care team and make appointments. If you have questions, please call your primary care clinic.  If you do not have a primary care provider, please call [phone] and they will assist you.        Care EveryWhere ID     This is your Care EveryWhere ID. This could be used by other organizations to access your [place] medical records  [id number]         Blood Pressure from Last 3 Encounters:   09/05/17 130/84   04/25/17 130/80    Weight from Last 3 Encounters:   09/05/17 73.1 kg (161 lb 1.6 oz)   04/25/17 72.4 kg (159 lb 9.6 oz)              We Performed the  Following     Neuromuscular Re-Education     Therapeutic Exercises        Primary Care Provider Office Phone # Fax #    Naomi Mcgill [phone]       [address] [facility] [address]        Equal Access to Services     YURIY LORD : Delia lily luevano naveeno Somarceloali, waaxda luqadaha, qaybta kaalmada adebruceyada, ludivina duarte laFarazsalina flores. So [facility] [phone].    ATENCIÓN: Si habla español, tiene a frost disposición servicios gratuitos de asistencia lingüística. Llame al [phone].    We comply with applicable federal civil rights laws and [place] laws. We do not discriminate on the basis of race, color, national origin, age, disability, sex, sexual orientation, or gender identity.            Thank you!     Thank you for choosing [place] [facility] PHYSICAL THERAPY  for your care. Our goal is always to provide you with excellent care. Hearing back from our patients is one way we can continue to improve our services. Please take a few minutes to complete the written survey that you may receive in the mail after your visit with us. Thank you!             Your Updated Medication List - Protect others around you: Learn how to safely use, store and throw away your medicines at www.disposemymeds.org.          This list is accurate as of 2/9/18  3:02 PM.  Always use your most recent med list.                   Brand Name Dispense Instructions for use Diagnosis    atorvastatin 20 MG tablet    LIPITOR    90 tablet    Take 1 tablet (20 mg) by mouth daily    Pure hypercholesterolemia       celeBREX 100 MG capsule   Generic drug:  celecoxib      Take 1 capsule (100 mg) by mouth as needed for moderate pain        clopidogrel 75 MG tablet    PLAVIX     Take 75 mg by mouth daily        fenofibrate 160 MG tablet      Take 160 mg by mouth daily        * lisinopril 10 MG tablet    PRINIVIL/ZESTRIL     Take 10 mg by mouth every morning        * lisinopril 20 MG tablet     PRINIVIL/ZESTRIL     Take 20 mg by mouth daily        PARoxetine 12.5 MG 24 hr tablet    PAXIL-CR     Take 12.5 mg by mouth daily        tiZANidine 4 MG tablet    ZANAFLEX     Take 4 mg by mouth daily        * Notice:  This list has 2 medication(s) that are the same as other medications prescribed for you. Read the directions carefully, and ask your doctor or other care provider to review them with you.

## 2018-04-10 DIAGNOSIS — E78.00 PURE HYPERCHOLESTEROLEMIA: ICD-10-CM

## 2018-04-10 RX ORDER — ATORVASTATIN CALCIUM 20 MG/1
TABLET, FILM COATED ORAL
Qty: 90 TABLET | Refills: 1 | Status: SHIPPED | OUTPATIENT
Start: 2018-04-10 | End: 2018-10-06

## 2018-04-10 NOTE — TELEPHONE ENCOUNTER
"Requested Prescriptions   Pending Prescriptions Disp Refills     atorvastatin (LIPITOR) 20 MG tablet [Pharmacy Med Name: ATORVASTATIN 20 MG TABLET] 90 tablet 3     Sig: TAKE 1 TABLET (20 MG) BY MOUTH DAILY    Statins Protocol Passed    4/10/2018  1:27 AM       Passed - LDL on file in past 12 months    Recent Labs   Lab Test  09/06/17   0834   LDL  110*            Passed - No abnormal creatine kinase in past 12 months    No lab results found.            Passed - Recent (12 mo) or future (30 days) visit within the authorizing provider's specialty    Patient had office visit in the last 12 months or has a visit in the next 30 days with authorizing provider or within the authorizing provider's specialty.  See \"Patient Info\" tab in inbasket, or \"Choose Columns\" in Meds & Orders section of the refill encounter.           Passed - Patient is age 18 or older       Passed - No active pregnancy on record       Passed - No positive pregnancy test in past 12 months        Last Written Prescription Date:  4/28/17  Last Fill Quantity: 90,  # refills: 3   Last office visit: 9/5/2017 with prescribing provider:  9/5/17   Future Office Visit:      "

## 2018-09-14 PROBLEM — M25.511 BILATERAL SHOULDER PAIN: Status: RESOLVED | Noted: 2018-02-01 | Resolved: 2018-09-14

## 2018-09-14 PROBLEM — M25.512 BILATERAL SHOULDER PAIN: Status: RESOLVED | Noted: 2018-02-01 | Resolved: 2018-09-14

## 2018-10-06 DIAGNOSIS — E78.00 PURE HYPERCHOLESTEROLEMIA: ICD-10-CM

## 2018-10-06 NOTE — TELEPHONE ENCOUNTER
"Requested Prescriptions   Pending Prescriptions Disp Refills     atorvastatin (LIPITOR) 20 MG tablet [Pharmacy Med Name: ATORVASTATIN 20 MG TABLET]  Last Written Prescription Date:  4/10/18  Last Fill Quantity: 90 TABLET,  # refills: 1   Last office visit: 9/5/17 with prescribing provider:  THIAGO   Future Office Visit:     90 tablet 1     Sig: TAKE 1 TABLET (20 MG) BY MOUTH DAILY    Statins Protocol Failed    10/6/2018  2:44 AM       Failed - LDL on file in past 12 months    Recent Labs   Lab Test  09/06/17   0834   LDL  110*            Failed - Recent (12 mo) or future (30 days) visit within the authorizing provider's specialty    Patient had office visit in the last 12 months or has a visit in the next 30 days with authorizing provider or within the authorizing provider's specialty.  See \"Patient Info\" tab in inbasket, or \"Choose Columns\" in Meds & Orders section of the refill encounter.           Passed - No abnormal creatine kinase in past 12 months    No lab results found.            Passed - Patient is age 18 or older       Passed - No active pregnancy on record       Passed - No positive pregnancy test in past 12 months          "

## 2018-10-06 NOTE — LETTER
[facility]  [address] [address]  [phone]            Bambi Daly  [address] [facility] [address]        October 8, 2018    Dear Bambi,    While refilling your prescription today, we noticed that you are due to have labs drawn and see your provider.  We will refill your prescription for 30 days, but a follow-up appointment must be made before any additional refills can be approved.     Taking care of your health is important to us and we look forward to seeing you in the near future.  Please call us at [phone] or [phone] (or use [email or web address]) to schedule an appointment.     Please disregard this notice if you have already made an appointment.    Sincerely,        [facility]

## 2018-10-08 RX ORDER — ATORVASTATIN CALCIUM 20 MG/1
TABLET, FILM COATED ORAL
Qty: 30 TABLET | Refills: 0 | Status: SHIPPED | OUTPATIENT
Start: 2018-10-08

## 2018-10-08 NOTE — TELEPHONE ENCOUNTER
Medication is being filled for 1 time refill only due to:  Patient needs labs and office visit .   Letter sent

## 2019-06-05 ENCOUNTER — MEDICAL CORRESPONDENCE (OUTPATIENT)
Dept: HEALTH INFORMATION MANAGEMENT | Facility: CLINIC | Age: 74
End: 2019-06-05

## 2019-06-17 DIAGNOSIS — R29.818 NEUROGENIC CLAUDICATION: ICD-10-CM

## 2019-06-17 DIAGNOSIS — M25.559 HIP JOINT PAIN: ICD-10-CM

## 2019-06-17 DIAGNOSIS — R42 DIZZINESS: ICD-10-CM

## 2019-06-17 DIAGNOSIS — R05.3 CHRONIC COUGH: ICD-10-CM

## 2019-06-17 DIAGNOSIS — M54.50 LOW BACK PAIN: ICD-10-CM

## 2019-06-17 DIAGNOSIS — I63.449: ICD-10-CM

## 2019-06-17 DIAGNOSIS — Z72.0 TOBACCO ABUSE: Primary | ICD-10-CM

## 2019-06-17 LAB
ALBUMIN SERPL-MCNC: 3.8 G/DL (ref 3.4–5)
ALP SERPL-CCNC: 90 U/L (ref 40–150)
ALT SERPL W P-5'-P-CCNC: 33 U/L (ref 0–50)
ANION GAP SERPL CALCULATED.3IONS-SCNC: 5 MMOL/L (ref 3–14)
AST SERPL W P-5'-P-CCNC: 20 U/L (ref 0–45)
BILIRUB SERPL-MCNC: 0.4 MG/DL (ref 0.2–1.3)
BUN SERPL-MCNC: 17 MG/DL (ref 7–30)
CALCIUM SERPL-MCNC: 9.4 MG/DL (ref 8.5–10.1)
CHLORIDE SERPL-SCNC: 109 MMOL/L (ref 94–109)
CHOLEST SERPL-MCNC: 192 MG/DL
CO2 SERPL-SCNC: 29 MMOL/L (ref 20–32)
CREAT SERPL-MCNC: 0.66 MG/DL (ref 0.52–1.04)
ERYTHROCYTE [DISTWIDTH] IN BLOOD BY AUTOMATED COUNT: 14.5 % (ref 10–15)
GFR SERPL CREATININE-BSD FRML MDRD: 86 ML/MIN/{1.73_M2}
GLUCOSE SERPL-MCNC: 125 MG/DL (ref 70–99)
HCT VFR BLD AUTO: 45.4 % (ref 35–47)
HDLC SERPL-MCNC: 43 MG/DL
HGB BLD-MCNC: 14.4 G/DL (ref 11.7–15.7)
LDLC SERPL CALC-MCNC: 117 MG/DL
MCH RBC QN AUTO: 29.1 PG (ref 26.5–33)
MCHC RBC AUTO-ENTMCNC: 31.7 G/DL (ref 31.5–36.5)
MCV RBC AUTO: 92 FL (ref 78–100)
NONHDLC SERPL-MCNC: 149 MG/DL
PLATELET # BLD AUTO: 294 10E9/L (ref 150–450)
POTASSIUM SERPL-SCNC: 4.2 MMOL/L (ref 3.4–5.3)
PROT SERPL-MCNC: 7.2 G/DL (ref 6.8–8.8)
RBC # BLD AUTO: 4.94 10E12/L (ref 3.8–5.2)
SODIUM SERPL-SCNC: 143 MMOL/L (ref 133–144)
TRIGL SERPL-MCNC: 162 MG/DL
VIT B12 SERPL-MCNC: 466 PG/ML (ref 193–986)
WBC # BLD AUTO: 6.7 10E9/L (ref 4–11)

## 2019-06-17 PROCEDURE — 80053 COMPREHEN METABOLIC PANEL: CPT | Performed by: INTERNAL MEDICINE

## 2019-06-17 PROCEDURE — 85027 COMPLETE CBC AUTOMATED: CPT | Performed by: INTERNAL MEDICINE

## 2019-06-17 PROCEDURE — 80061 LIPID PANEL: CPT | Performed by: INTERNAL MEDICINE

## 2019-06-17 PROCEDURE — 82607 VITAMIN B-12: CPT

## 2019-06-17 PROCEDURE — 36415 COLL VENOUS BLD VENIPUNCTURE: CPT | Performed by: INTERNAL MEDICINE

## 2019-06-20 ENCOUNTER — OFFICE VISIT (OUTPATIENT)
Dept: URGENT CARE | Facility: URGENT CARE | Age: 74
End: 2019-06-20
Payer: MEDICARE

## 2019-06-20 VITALS
HEART RATE: 76 BPM | BODY MASS INDEX: 31.1 KG/M2 | HEIGHT: 62 IN | OXYGEN SATURATION: 97 % | TEMPERATURE: 98.1 F | WEIGHT: 169 LBS | SYSTOLIC BLOOD PRESSURE: 140 MMHG | DIASTOLIC BLOOD PRESSURE: 80 MMHG

## 2019-06-20 DIAGNOSIS — R42 LIGHTHEADEDNESS: Primary | ICD-10-CM

## 2019-06-20 DIAGNOSIS — M62.81 GENERALIZED MUSCLE WEAKNESS: ICD-10-CM

## 2019-06-20 DIAGNOSIS — R35.0 URINARY FREQUENCY: ICD-10-CM

## 2019-06-20 DIAGNOSIS — H61.21 IMPACTED CERUMEN OF RIGHT EAR: ICD-10-CM

## 2019-06-20 LAB
ALBUMIN UR-MCNC: NEGATIVE MG/DL
APPEARANCE UR: CLEAR
BILIRUB UR QL STRIP: NEGATIVE
COLOR UR AUTO: YELLOW
GLUCOSE UR STRIP-MCNC: NEGATIVE MG/DL
HGB UR QL STRIP: NEGATIVE
KETONES UR STRIP-MCNC: NEGATIVE MG/DL
LEUKOCYTE ESTERASE UR QL STRIP: NEGATIVE
NITRATE UR QL: NEGATIVE
PH UR STRIP: 7.5 PH (ref 5–7)
RBC #/AREA URNS AUTO: ABNORMAL /HPF
SOURCE: ABNORMAL
SP GR UR STRIP: 1.01 (ref 1–1.03)
UROBILINOGEN UR STRIP-ACNC: 0.2 EU/DL (ref 0.2–1)
WBC #/AREA URNS AUTO: ABNORMAL /HPF

## 2019-06-20 PROCEDURE — 81001 URINALYSIS AUTO W/SCOPE: CPT | Performed by: NURSE PRACTITIONER

## 2019-06-20 PROCEDURE — 99214 OFFICE O/P EST MOD 30 MIN: CPT | Performed by: NURSE PRACTITIONER

## 2019-06-20 RX ORDER — MECLIZINE HYDROCHLORIDE 25 MG/1
25 TABLET ORAL 3 TIMES DAILY PRN
Qty: 25 TABLET | Refills: 0 | Status: SHIPPED | OUTPATIENT
Start: 2019-06-20

## 2019-06-20 ASSESSMENT — ENCOUNTER SYMPTOMS
LIGHT-HEADEDNESS: 1
DYSURIA: 0
VOMITING: 0
SHORTNESS OF BREATH: 0
FEVER: 0
PALPITATIONS: 0
CHILLS: 0
COUGH: 0
WEAKNESS: 0
NAUSEA: 0
FREQUENCY: 1

## 2019-06-20 ASSESSMENT — MIFFLIN-ST. JEOR: SCORE: 1214.83

## 2019-06-20 NOTE — PROGRESS NOTES
SUBJECTIVE:   Bambi Daly is a 74 year old female presenting with a chief complaint of   Chief Complaint   Patient presents with     Urgent Care     Pt states ear wax,  dizzy,  lightheaded sxs 2x days        She is an established patient of [facility].    Bambi Daly presents to the urgent care with lightheaded, possible ear wax impaction.   No room spinning dizziness.  No symptoms if head is held perfectly still and sx otherwise mild.   Denies other neuro symptoms or cardiac symptoms such as chest pain, shortness of breath, focal weakness.    Symptoms started 2 days ago and have been stable.    Symptoms associated with ear drainage     Patient denies nausea/vomiting.       Relevant past medical history includes hx vertigo last year, resolved with antivert and time.  Had a stroke in 2015 with no residual deficits and is on Plavix.      Murmur noted RSB with nml echo 3 years ago.      See ROS for associated symptoms and/or pertinent negatives.        Review of Systems   Constitutional: Negative for chills and fever.   HENT: Negative for congestion.    Respiratory: Negative for cough and shortness of breath.    Cardiovascular: Negative for chest pain and palpitations.   Gastrointestinal: Negative for nausea and vomiting.   Genitourinary: Positive for frequency (q 2 hours, more frequent lately  ). Negative for dysuria.   Neurological: Positive for light-headedness. Negative for syncope and weakness.       Past Medical History:   Diagnosis Date     Anxiety      Benign essential hypertension      History of stroke 2015    no residual deficits; on Plavix     History of TIA (transient ischemic attack)      Impaired fasting glucose      Pure hypercholesterolemia      Family History   Problem Relation Age of Onset     Hypertension Mother      Coronary Artery Disease Mother         CABG later in life     Heart Surgery Mother         AVR     Neurologic Disorder Father         Creutzfeldt Eliu Disease     Diabetes No  "family hx of      Myocardial Infarction No family hx of      Cerebrovascular Disease No family hx of      Coronary Artery Disease Early Onset No family hx of      Breast Cancer No family hx of      Ovarian Cancer No family hx of      Colon Cancer No family hx of      Current Outpatient Medications   Medication Sig Dispense Refill     atorvastatin (LIPITOR) 20 MG tablet TAKE 1 TABLET (20 MG) BY MOUTH DAILY 30 tablet 0     celecoxib (CELEBREX) 100 MG capsule Take 1 capsule (100 mg) by mouth as needed for moderate pain       clopidogrel (PLAVIX) 75 MG tablet Take 75 mg by mouth daily  3     fenofibrate 160 MG tablet Take 160 mg by mouth daily  3     lisinopril (PRINIVIL/ZESTRIL) 10 MG tablet Take 10 mg by mouth every morning  3     lisinopril (PRINIVIL/ZESTRIL) 20 MG tablet Take 20 mg by mouth daily  3     meclizine (ANTIVERT) 25 MG tablet Take 1 tablet (25 mg) by mouth 3 times daily as needed for dizziness 25 tablet 0     PARoxetine (PAXIL-CR) 12.5 MG 24 hr tablet Take 12.5 mg by mouth daily  3     tiZANidine (ZANAFLEX) 4 MG tablet Take 4 mg by mouth daily  3     Social History     Tobacco Use     Smoking status: Former Smoker     Packs/day: 0.50     Years: 35.00     Pack years: 17.50     Types: Cigarettes     Last attempt to quit: 1999     Years since quittin.4     Smokeless tobacco: Never Used   Substance Use Topics     Alcohol use: Yes     Comment: 1 drink/week       OBJECTIVE  /80   Pulse 76   Temp 98.1  F (36.7  C) (Tympanic)   Ht 1.575 m (5' 2\")   Wt 76.7 kg (169 lb)   SpO2 97%   BMI 30.91 kg/m      Physical Exam   Constitutional: She is oriented to person, place, and time. She appears well-developed and well-nourished. No distress.   HENT:   Left Ear: Tympanic membrane normal.   Right w partial wax impaction   Eyes: Conjunctivae are normal. Right eye exhibits no discharge. Left eye exhibits no discharge.   Cardiovascular: Normal rate, regular rhythm and intact distal pulses.   Murmur (best " heard RSB) heard.  Pulmonary/Chest: Effort normal.   Musculoskeletal: Normal range of motion.   Neurological: She is alert and oriented to person, place, and time. She has normal strength. No sensory deficit. Coordination normal.   Off balance with romberg   Skin: Skin is warm and dry. Capillary refill takes less than 2 seconds.   Psychiatric: She has a normal mood and affect. Her behavior is normal. Judgment and thought content normal.       Labs:  Results for orders placed or performed in visit on 06/20/19 (from the past 24 hour(s))   UA with Microscopic reflex to Culture   Result Value Ref Range    Color Urine Yellow     Appearance Urine Clear     Glucose Urine Negative NEG^Negative mg/dL    Bilirubin Urine Negative NEG^Negative    Ketones Urine Negative NEG^Negative mg/dL    Specific Gravity Urine 1.010 1.003 - 1.035    pH Urine 7.5 (H) 5.0 - 7.0 pH    Protein Albumin Urine Negative NEG^Negative mg/dL    Urobilinogen Urine 0.2 0.2 - 1.0 EU/dL    Nitrite Urine Negative NEG^Negative    Blood Urine Negative NEG^Negative    Leukocyte Esterase Urine Negative NEG^Negative    Source Midstream Urine     WBC Urine 0 - 5 OTO5^0 - 5 /HPF    RBC Urine O - 2 OTO2^O - 2 /HPF           ASSESSMENT:      ICD-10-CM    1. Lightheadedness R42 meclizine (ANTIVERT) 25 MG tablet   2. Generalized muscle weakness M62.81 UA with Microscopic reflex to Culture   3. Impacted cerumen of right ear H61.21    4. Urinary frequency R35.0         Medical Decision Making:    Differential Diagnosis:  BPPV, medication side effect, UTI with frequency every 2 hours, CVA.  I do not believe she had a CVA because symptoms are absent when her head is still.  Urine was normal today.  Urinary frequency seems to be long-standing, but possibly a little worse recently.  Nicho-Hallpike is negative for BPPV.  Patient had similar which resolved with Antivert in the past, so will prescribe a few days worth and have her follow-up.      No other red flag symptoms  notices chest pain, shortness of breath, syncope, fevers.    Return precautions discussed.    Serious Comorbid Conditions:  Adult:  Anticoagulation and History of stroke in 2015 with no residual deficits    PLAN:    Antivert, return if worse.  Discussed s/s stroke including dizziness which doesn't stop with holding head still.  Patient reports improvement of symptoms with flushing of right ear and removal of partial wax impaction.    Followup:    If not improving or if condition worsens, follow up with your Primary Care Provider in 4-5 days, sooner if worse    Patient Instructions   Urine okay.  Antivert 3 times daily as needed.  Follow up with your doctor next week if symptoms still present for recheck or return to UC/ER if worsening over the weekend.        Patient Education     Dizziness (Uncertain Cause)  Dizziness is a common symptom. It may be described as lightheadedness, spinning, or feeling like you are going to faint. Dizziness can have many causes.  Be sure to tell the healthcare provider about:    All medicines you take, including prescription, over-the-counter, herbs, and supplements    Any other symptoms you have    Any health problems you are being treated for    Any past major health problems you've had, such as a heart attack, balance issues, hearing problems, or blood pressure problems    Anything that causes the dizziness to get worse or better  Today's exam did not show an exact cause for your dizziness. Other tests may be needed. Follow up with your healthcare provider.  Home care    Dizziness that occurs with sudden standing may be a sign of mild dehydration. Drink extra fluids for the next few days.    If you recently started a new medicine, stopped a medicine, or had the dose of a current medicine changed, talk with the prescribing healthcare provider. Your medicine plan may need adjustment.    If dizziness lasts more than a few seconds, sit or lie down until it passes. This may help prevent  injury in case you pass out. Get up slowly when you feel better.    Don't drive or use power tools or dangerous equipment until you have had no dizziness for at least 48 hours.  Follow-up care  Follow up with your healthcare provider for further evaluation within the next 7 days or as advised.  When to seek medical advice  Call your healthcare provider for any of the following:    Worsening of symptoms or new symptoms    Passing out or seizure    Repeated vomiting    Headache    Palpitations (the sense that your heart is fluttering or beating fast or hard)    Shortness of breath    Blood in vomit or stool (black or red color)    Weakness of an arm or leg or 1 side of the face    Vision or hearing changes    Trouble walking or speaking    Chest, arm, neck, back, or jaw pain  Date Last Reviewed: 11/1/2017 2000-2018 [employer]. [address] [place] [address], [place] [address]. All rights reserved. This information is not intended as a substitute for professional medical care. Always follow your healthcare professional's instructions.

## 2019-06-20 NOTE — PATIENT INSTRUCTIONS
Urine okay.  Antivert 3 times daily as needed.  Follow up with your doctor next week if symptoms still present for recheck or return to UC/ER if worsening over the weekend.        Patient Education     Dizziness (Uncertain Cause)  Dizziness is a common symptom. It may be described as lightheadedness, spinning, or feeling like you are going to faint. Dizziness can have many causes.  Be sure to tell the healthcare provider about:    All medicines you take, including prescription, over-the-counter, herbs, and supplements    Any other symptoms you have    Any health problems you are being treated for    Any past major health problems you've had, such as a heart attack, balance issues, hearing problems, or blood pressure problems    Anything that causes the dizziness to get worse or better  Today's exam did not show an exact cause for your dizziness. Other tests may be needed. Follow up with your healthcare provider.  Home care    Dizziness that occurs with sudden standing may be a sign of mild dehydration. Drink extra fluids for the next few days.    If you recently started a new medicine, stopped a medicine, or had the dose of a current medicine changed, talk with the prescribing healthcare provider. Your medicine plan may need adjustment.    If dizziness lasts more than a few seconds, sit or lie down until it passes. This may help prevent injury in case you pass out. Get up slowly when you feel better.    Don't drive or use power tools or dangerous equipment until you have had no dizziness for at least 48 hours.  Follow-up care  Follow up with your healthcare provider for further evaluation within the next 7 days or as advised.  When to seek medical advice  Call your healthcare provider for any of the following:    Worsening of symptoms or new symptoms    Passing out or seizure    Repeated vomiting    Headache    Palpitations (the sense that your heart is fluttering or beating fast or hard)    Shortness of  breath    Blood in vomit or stool (black or red color)    Weakness of an arm or leg or 1 side of the face    Vision or hearing changes    Trouble walking or speaking    Chest, arm, neck, back, or jaw pain  Date Last Reviewed: 11/1/2017 2000-2018 [employer]. [address] [place] [address]. All rights reserved. This information is not intended as a substitute for professional medical care. Always follow your healthcare professional's instructions.

## 2020-03-10 ENCOUNTER — HEALTH MAINTENANCE LETTER (OUTPATIENT)
Age: 75
End: 2020-03-10

## 2020-06-02 ENCOUNTER — OFFICE VISIT (OUTPATIENT)
Dept: PODIATRY CLINIC | Facility: CLINIC | Age: 75
End: 2020-06-02
Payer: MEDICARE

## 2020-06-02 DIAGNOSIS — B35.1 ONYCHOMYCOSIS: Primary | ICD-10-CM

## 2020-06-02 PROCEDURE — G0463 HOSPITAL OUTPT CLINIC VISIT: HCPCS | Performed by: PODIATRIST

## 2020-06-02 PROCEDURE — 99202 OFFICE O/P NEW SF 15 MIN: CPT | Performed by: PODIATRIST

## 2020-06-02 RX ORDER — TIZANIDINE 4 MG/1
TABLET ORAL
COMMUNITY
Start: 2020-03-15

## 2020-06-02 RX ORDER — ATORVASTATIN CALCIUM 20 MG/1
TABLET, FILM COATED ORAL
COMMUNITY
Start: 2020-05-14

## 2020-06-02 RX ORDER — CELECOXIB 100 MG/1
CAPSULE ORAL
COMMUNITY
Start: 2020-03-25

## 2020-06-02 RX ORDER — FENOFIBRATE 160 MG/1
TABLET ORAL
COMMUNITY
Start: 2020-04-29

## 2020-06-02 NOTE — PROGRESS NOTES
HPI:    Patient ID: Susy Scott is a 67year old female. This pleasant 76-year-old female presents to me as a new patient today. She is accompanied by her  who has been a previous primary care physician of this organization.   Her primary conc this patient if and when she should have difficulty managing this nail again.   She and her  are well-informed they have a good understanding follow-up if needed           ASSESSMENT/PLAN:   Onychomycosis  (primary encounter diagnosis)    No orders o

## 2020-07-10 ENCOUNTER — OFFICE VISIT (OUTPATIENT)
Dept: DERMATOLOGY CLINIC | Facility: CLINIC | Age: 75
End: 2020-07-10
Payer: MEDICARE

## 2020-07-10 DIAGNOSIS — L82.1 SEBORRHEIC KERATOSES: ICD-10-CM

## 2020-07-10 DIAGNOSIS — D23.5 BENIGN NEOPLASM OF SKIN OF TRUNK, EXCEPT SCROTUM: ICD-10-CM

## 2020-07-10 DIAGNOSIS — D23.4 BENIGN NEOPLASM OF SCALP AND SKIN OF NECK: ICD-10-CM

## 2020-07-10 DIAGNOSIS — L82.0 INFLAMED SEBORRHEIC KERATOSIS: Primary | ICD-10-CM

## 2020-07-10 DIAGNOSIS — D23.60 BENIGN NEOPLASM OF SKIN OF UPPER LIMB, INCLUDING SHOULDER, UNSPECIFIED LATERALITY: ICD-10-CM

## 2020-07-10 DIAGNOSIS — D23.30 BENIGN NEOPLASM OF SKIN OF FACE: ICD-10-CM

## 2020-07-10 PROCEDURE — 99202 OFFICE O/P NEW SF 15 MIN: CPT | Performed by: DERMATOLOGY

## 2020-07-10 PROCEDURE — G0463 HOSPITAL OUTPT CLINIC VISIT: HCPCS | Performed by: DERMATOLOGY

## 2020-07-19 NOTE — PROGRESS NOTES
Latonia Cruz is a 67year old female. HPI:     CC:  Patient presents with:  Lesion: left upper back. Pt also reports itchy spots on bilateral lower extremities        Allergies:  Patient has no known allergies. HISTORY:    History reviewed.  No pert connections:        Talks on phone: Not on file        Gets together: Not on file        Attends [employer] service: Not on file        Active member of club or organization: Not on file        Attends meetings of clubs or organizations: Not on file date for lesions noted . Otherwise remarkable for lesions as noted on map.   See details of examination  See Assessment /Plan for additional history and physical exam also:    Assessment / plan:    No orders of the defined types were placed in this enc resulting from errors in recognition.

## 2020-12-27 ENCOUNTER — HEALTH MAINTENANCE LETTER (OUTPATIENT)
Age: 75
End: 2020-12-27

## 2021-02-17 ENCOUNTER — IMMUNIZATION (OUTPATIENT)
Dept: LAB | Age: 76
End: 2021-02-17
Attending: HOSPITALIST
Payer: MEDICARE

## 2021-02-17 DIAGNOSIS — Z23 NEED FOR VACCINATION: Primary | ICD-10-CM

## 2021-02-17 PROCEDURE — 0001A SARSCOV2 VAC 30MCG/0.3ML IM: CPT

## 2021-03-10 ENCOUNTER — TELEPHONE (OUTPATIENT)
Dept: OTHER | Age: 76
End: 2021-03-10

## 2021-03-10 ENCOUNTER — IMMUNIZATION (OUTPATIENT)
Dept: LAB | Age: 76
End: 2021-03-10
Attending: HOSPITALIST
Payer: MEDICARE

## 2021-03-10 DIAGNOSIS — Z23 NEED FOR VACCINATION: Primary | ICD-10-CM

## 2021-03-10 PROCEDURE — 0002A SARSCOV2 VAC 30MCG/0.3ML IM: CPT

## 2021-03-10 NOTE — TELEPHONE ENCOUNTER
Received a refill request for the patient from [facility]   Pharmacy NCPDP ID Address Phone   [id number] [id number] [id number] [phone] (     PAROXETINE ER 12.5 MG TABLET [phone] [id number] No   Written Date Last [place] [facility]

## 2021-04-24 ENCOUNTER — HEALTH MAINTENANCE LETTER (OUTPATIENT)
Age: 76
End: 2021-04-24

## 2021-10-09 ENCOUNTER — HEALTH MAINTENANCE LETTER (OUTPATIENT)
Age: 76
End: 2021-10-09

## 2022-05-21 ENCOUNTER — HEALTH MAINTENANCE LETTER (OUTPATIENT)
Age: 77
End: 2022-05-21

## 2022-09-11 ENCOUNTER — HEALTH MAINTENANCE LETTER (OUTPATIENT)
Age: 77
End: 2022-09-11

## 2023-06-03 ENCOUNTER — HEALTH MAINTENANCE LETTER (OUTPATIENT)
Age: 78
End: 2023-06-03